# Patient Record
Sex: MALE | Race: WHITE
[De-identification: names, ages, dates, MRNs, and addresses within clinical notes are randomized per-mention and may not be internally consistent; named-entity substitution may affect disease eponyms.]

---

## 2020-07-16 PROBLEM — Z00.00 ENCOUNTER FOR PREVENTIVE HEALTH EXAMINATION: Status: ACTIVE | Noted: 2020-07-16

## 2020-07-30 ENCOUNTER — NON-APPOINTMENT (OUTPATIENT)
Age: 75
End: 2020-07-30

## 2020-07-30 ENCOUNTER — APPOINTMENT (OUTPATIENT)
Dept: OPHTHALMOLOGY | Facility: CLINIC | Age: 75
End: 2020-07-30
Payer: MEDICARE

## 2020-07-30 PROCEDURE — 92004 COMPRE OPH EXAM NEW PT 1/>: CPT

## 2020-08-13 ENCOUNTER — TRANSCRIPTION ENCOUNTER (OUTPATIENT)
Age: 75
End: 2020-08-13

## 2021-07-12 ENCOUNTER — NON-APPOINTMENT (OUTPATIENT)
Age: 76
End: 2021-07-12

## 2021-07-12 ENCOUNTER — APPOINTMENT (OUTPATIENT)
Dept: OPHTHALMOLOGY | Facility: CLINIC | Age: 76
End: 2021-07-12
Payer: MEDICARE

## 2021-07-12 PROCEDURE — 92014 COMPRE OPH EXAM EST PT 1/>: CPT

## 2022-07-19 ENCOUNTER — APPOINTMENT (OUTPATIENT)
Dept: OPHTHALMOLOGY | Facility: CLINIC | Age: 77
End: 2022-07-19

## 2022-07-19 ENCOUNTER — NON-APPOINTMENT (OUTPATIENT)
Age: 77
End: 2022-07-19

## 2022-07-19 PROCEDURE — 92014 COMPRE OPH EXAM EST PT 1/>: CPT

## 2023-07-20 ENCOUNTER — NON-APPOINTMENT (OUTPATIENT)
Age: 78
End: 2023-07-20

## 2023-07-20 ENCOUNTER — APPOINTMENT (OUTPATIENT)
Dept: OPHTHALMOLOGY | Facility: CLINIC | Age: 78
End: 2023-07-20
Payer: MEDICARE

## 2023-07-20 PROCEDURE — 92014 COMPRE OPH EXAM EST PT 1/>: CPT

## 2023-10-02 ENCOUNTER — APPOINTMENT (OUTPATIENT)
Dept: OPHTHALMOLOGY | Facility: CLINIC | Age: 78
End: 2023-10-02
Payer: MEDICARE

## 2023-10-02 ENCOUNTER — NON-APPOINTMENT (OUTPATIENT)
Age: 78
End: 2023-10-02

## 2023-10-02 PROCEDURE — 92014 COMPRE OPH EXAM EST PT 1/>: CPT

## 2024-04-22 ENCOUNTER — NON-APPOINTMENT (OUTPATIENT)
Age: 79
End: 2024-04-22

## 2024-04-22 RX ORDER — TAMSULOSIN HYDROCHLORIDE 0.4 MG/1
0.4 CAPSULE ORAL
Refills: 0 | Status: ACTIVE | COMMUNITY

## 2024-04-22 RX ORDER — DISOPYRAMIDE PHOSPHATE 100 MG/1
100 CAPSULE, GELATIN COATED ORAL
Refills: 0 | Status: ACTIVE | COMMUNITY

## 2024-04-22 RX ORDER — VERAPAMIL HYDROCHLORIDE 120 MG/1
120 TABLET ORAL
Refills: 0 | Status: ACTIVE | COMMUNITY

## 2024-04-22 RX ORDER — NAPROXEN 500 MG/1
500 TABLET ORAL
Refills: 0 | Status: ACTIVE | COMMUNITY

## 2024-04-22 RX ORDER — FINASTERIDE 5 MG/1
5 TABLET, FILM COATED ORAL
Refills: 0 | Status: ACTIVE | COMMUNITY

## 2024-04-22 RX ORDER — MELOXICAM 15 MG/1
15 TABLET ORAL
Refills: 0 | Status: ACTIVE | COMMUNITY

## 2024-04-22 RX ORDER — POLYETHYLENE GLYCOL 3350 17 G/17G
17 POWDER, FOR SOLUTION ORAL
Refills: 0 | Status: ACTIVE | COMMUNITY

## 2024-04-23 ENCOUNTER — APPOINTMENT (OUTPATIENT)
Dept: PODIATRY | Facility: CLINIC | Age: 79
End: 2024-04-23
Payer: MEDICARE

## 2024-04-23 VITALS — HEIGHT: 70 IN | WEIGHT: 197 LBS | BODY MASS INDEX: 28.2 KG/M2

## 2024-04-23 DIAGNOSIS — M79.671 PAIN IN RIGHT FOOT: ICD-10-CM

## 2024-04-23 DIAGNOSIS — L60.0 INGROWING NAIL: ICD-10-CM

## 2024-04-23 DIAGNOSIS — M76.71 PERONEAL TENDINITIS, RIGHT LEG: ICD-10-CM

## 2024-04-23 PROCEDURE — 29540 STRAPPING ANKLE &/FOOT: CPT | Mod: RT

## 2024-04-23 PROCEDURE — 99204 OFFICE O/P NEW MOD 45 MIN: CPT | Mod: 25

## 2024-04-23 PROCEDURE — 99214 OFFICE O/P EST MOD 30 MIN: CPT | Mod: 25

## 2024-04-25 PROBLEM — L60.0 INGROWN RIGHT BIG TOENAIL: Status: ACTIVE | Noted: 2024-04-22

## 2024-04-25 PROBLEM — M79.671 RIGHT FOOT PAIN: Status: ACTIVE | Noted: 2024-04-25

## 2024-04-25 PROBLEM — M76.71 PERONEAL TENDINITIS OF RIGHT LOWER EXTREMITY: Status: ACTIVE | Noted: 2024-04-25

## 2024-04-25 NOTE — PHYSICAL EXAM
[TextEntry] : There is pain along the peroneal tendon complex in the lateral aspect of the right foot.  Swelling is minimal.  Ecchymosis is absent.  The lateral aspect of the right hallux nail is incurvated with swelling in the nail fold.  No pus or discharge is noted.

## 2024-04-25 NOTE — HISTORY OF PRESENT ILLNESS
PreOp DX:  36w5d Pregnancy     Gestational hypertension with intermittent severe hypertension    3 previous C-sections    History of  delivery for severe preeclampsia         Post OP Dx:  Same + live male                             Procedure:   section / Assistance     Anesthesia:  Spinal         Under spinal anesthesia the abdomen was prepared and draped . In the absence of a resident I assisted  Dr. Jimi Carlin who performed a  section, with retraction, exposure and delivery of a live infant and suture management/cutting throughout the case. Then the uterus and the abdomen were closed. Procedure was well tolerated. [FreeTextEntry1] : He returns stating that he was going to come in for his ingrown toenail and wanted to have that looked at as well, but states that he had an incident over the weekend.  He was sitting in a chair and with his foot in a certain position, when he went to get up, he could not put weight on the right foot.  He states that it was extremely sore and in spasm.  He states that he felt a little better yesterday, but he was walking with a crutch.  Today, it is slightly better, but he wanted to have it checked out since he was here.  He denies any trauma or specific injury.  He denies any bruising. He states that he uses Voltaren gel on it a few times.

## 2024-04-25 NOTE — ASSESSMENT
[FreeTextEntry1] : Assessment: Peroneal tendinitis, right foot. Ingrown toenail, right hallux, lateral aspect.  Plan: After further assessment the right foot was placed in approximately 90 degrees to the ankle with the foot in as close to neutral position as possible.  The foot and ankle were prepped to be clean and dry.  Pre tape spray may have been used on the foot and ankle as indicated.  Pre-tape adhesive spray provides an extra sticky, waterproof barrier, that helps tapes and strapping stick better to the skin. Perfect for oily skin, sweaty skin, water sports and extreme environments. It can be a game-changer when strapping challenging areas such as sweaty or wet ankles & feet. I then applied a strapping to the right foot and ankle for support and compression to help relieve pressure and symptoms related to the patient's foot/ankle problem.   The strapping can aid recovery by supporting the muscles, tendons, or ligaments around the affected painful area, as well as improving blood flow to the area. Plus, the strapping will reduce pain and improve the gait, preventing further injury from poor alignment. The strapping also provides proprioceptive feedback. The strapping can reduce the amount of stretching and moving the ligaments do when the patient is weight bearing. This not only gives tissues a better chance to heal, but it also helps prevent further damage. This strapping also minimizes foot pronation, collapse, or flattening which causes over use and irritation to the muscle, tendons, and ligaments in the foot. Often, relief of pain with the strapping is a diagnostic indication for the need of functional orthotic devices. In general, strappings may be used to treat strains, sprains, dislocations, and some fractures. Strapping the foot and ankle provides the external stabilization that stretched ligaments (tissues connecting bone to bone) need while they heal. Most studies show that strapping the foot and ankle decreases the incidence and severity of the affected area.  Besides physical support, strapping can increase biofeedback and increase proprioception, which is a body's ability to know where it is thus aiding in healing and reducing further exacerbation of the pain. The patient was instructed to leave the strapping in place for the next few days to a week, if it was comfortable.  The patient was advised to keep the strapping dry, but leave it on and dry with a hair dryer if it got wet.   The patient was further instructed that if the strapping was uncomfortable or causes any problems it should be removed right away and the office notified. macro, right staph foot macro. I'm going to continue on the Voltaren gel. Straight back procedure macro, PTR, four to six weeks are as needed.  The patient was instructed to rest, ice and elevate the foot as much as possible.  I advised him to continue on the Voltaren gel.  I performed straightback procedures utilizing a 12.7 cm sterile box lock, double spring fine cut back D tip fine tip stainless steel nail splitter removing the medial and lateral borders of both the left and right hallux nails as far back as tolerable and applied Amerigel wound gel with sterile compression dressings. The patient was instructed to start soaking the feet in lukewarm water and Epsom salts, 2 tablespoons per pint for 20 minutes twice per day.  The patient was advised to dry the feet with a clean towel and then apply a sterile dressing to the area for the next 5 days.  PTR:  4 to 6 weeks or as needed.

## 2024-07-18 ENCOUNTER — APPOINTMENT (OUTPATIENT)
Dept: OPHTHALMOLOGY | Facility: CLINIC | Age: 79
End: 2024-07-18
Payer: MEDICARE

## 2024-07-18 ENCOUNTER — NON-APPOINTMENT (OUTPATIENT)
Age: 79
End: 2024-07-18

## 2024-07-18 PROCEDURE — 92014 COMPRE OPH EXAM EST PT 1/>: CPT

## 2025-07-14 ENCOUNTER — APPOINTMENT (OUTPATIENT)
Dept: OPHTHALMOLOGY | Facility: CLINIC | Age: 80
End: 2025-07-14
Payer: MEDICARE

## 2025-07-14 ENCOUNTER — NON-APPOINTMENT (OUTPATIENT)
Age: 80
End: 2025-07-14

## 2025-07-14 PROCEDURE — 92014 COMPRE OPH EXAM EST PT 1/>: CPT
